# Patient Record
Sex: FEMALE | NOT HISPANIC OR LATINO | URBAN - METROPOLITAN AREA
[De-identification: names, ages, dates, MRNs, and addresses within clinical notes are randomized per-mention and may not be internally consistent; named-entity substitution may affect disease eponyms.]

---

## 2024-03-21 DIAGNOSIS — H10.33 ACUTE BACTERIAL CONJUNCTIVITIS OF BOTH EYES: Primary | ICD-10-CM

## 2024-03-21 RX ORDER — POLYMYXIN B SULFATE AND TRIMETHOPRIM 1; 10000 MG/ML; [USP'U]/ML
1 SOLUTION OPHTHALMIC EVERY 4 HOURS
Qty: 10 ML | Refills: 0 | Status: SHIPPED | OUTPATIENT
Start: 2024-03-21 | End: 2024-03-26

## 2024-03-21 NOTE — PROGRESS NOTES
"History Of Present Illness  Kelsea Otto is a 33 y.o. female presenting for \"No chief complaint on file..\"    HPI     Past Medical History  There are no problems to display for this patient.       Medications  No current outpatient medications     Surgical History  She has a past surgical history that includes Tonsillectomy (01/06/2016).     Social History  She has no history on file for tobacco use, alcohol use, and drug use.    Family History  No family history on file.     Allergies  Patient has no allergy information on record.    ROS  Review of Systems     Last Recorded Vitals  There were no vitals taken for this visit.    Physical Exam    Relevant Results      Assessment/Plan   There are no diagnoses linked to this encounter.        COUNSELING      Medication education:              Education:  The patient is counseled regarding potential side-effects of any and all new medications             Understanding:  Patient expressed understanding             Adherence:  No barriers to adherence identified        Mary Hwang, APRN-CNP           "

## 2025-03-13 ENCOUNTER — OFFICE VISIT (OUTPATIENT)
Dept: URGENT CARE | Age: 35
End: 2025-03-13
Payer: COMMERCIAL

## 2025-03-13 VITALS
OXYGEN SATURATION: 97 % | RESPIRATION RATE: 20 BRPM | SYSTOLIC BLOOD PRESSURE: 136 MMHG | HEIGHT: 64 IN | BODY MASS INDEX: 34.15 KG/M2 | HEART RATE: 95 BPM | WEIGHT: 200 LBS | TEMPERATURE: 97.9 F | DIASTOLIC BLOOD PRESSURE: 88 MMHG

## 2025-03-13 DIAGNOSIS — J01.00 ACUTE MAXILLARY SINUSITIS, RECURRENCE NOT SPECIFIED: Primary | ICD-10-CM

## 2025-03-13 DIAGNOSIS — M62.838 MUSCLE SPASM: ICD-10-CM

## 2025-03-13 RX ORDER — METHOCARBAMOL 500 MG/1
500 TABLET, FILM COATED ORAL 4 TIMES DAILY
Qty: 28 TABLET | Refills: 0 | Status: SHIPPED | OUTPATIENT
Start: 2025-03-13 | End: 2025-03-20

## 2025-03-13 RX ORDER — DOXYCYCLINE HYCLATE 100 MG
100 TABLET ORAL 2 TIMES DAILY
Qty: 20 TABLET | Refills: 0 | Status: SHIPPED | OUTPATIENT
Start: 2025-03-13 | End: 2025-03-23

## 2025-03-13 ASSESSMENT — PATIENT HEALTH QUESTIONNAIRE - PHQ9
2. FEELING DOWN, DEPRESSED OR HOPELESS: NOT AT ALL
1. LITTLE INTEREST OR PLEASURE IN DOING THINGS: NOT AT ALL
SUM OF ALL RESPONSES TO PHQ9 QUESTIONS 1 AND 2: 0

## 2025-03-13 NOTE — PROGRESS NOTES
"Subjective   Patient ID: Kelsea Otto is a 34 y.o. female. They present today with a chief complaint of Nasal Congestion (2 weeks, now having neck pain, ).    History of Present Illness  This is a 34 year old female who presents to the urgent care with c/o uri symptoms/sinus pain and pressure for the past 2 to 3 weeks.  Patient states she is also been under more stress and is having bilateral upper shoulder pain.  Patient denies any chest pain.  Patient states she also has a minor cough patient states no improvement with over-the-counter medication.  Patient denies any fevers or chills.  Patient denies any other systemic complaints at this time. 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           Past Medical History  Allergies as of 03/13/2025 - Reviewed 03/13/2025   Allergen Reaction Noted    Cefaclor Swelling 03/13/2025       (Not in a hospital admission)       Past Medical History:   Diagnosis Date    Encounter for contraceptive management, unspecified 07/19/2016    Contraception management    Encounter for gynecological examination (general) (routine) without abnormal findings 05/26/2017    Well woman exam with routine gynecological exam    Hypothyroidism, unspecified 01/06/2016    Hypothyroidism       Past Surgical History:   Procedure Laterality Date    TONSILLECTOMY  01/06/2016    Tonsillectomy        reports that she has never smoked. She has never used smokeless tobacco.    Review of Systems  Review of Systems   All other systems reviewed and are negative.                                 Objective    Vitals:    03/13/25 1147 03/13/25 1154 03/13/25 1204   BP: (!) 156/99 (!) 153/107 136/88   Pulse: 100 95    Resp: 20     Temp: 36.6 °C (97.9 °F)     SpO2: 97%     Weight: 90.7 kg (200 lb)     Height: 1.626 m (5' 4\")       Patient's last menstrual period was 03/03/2025.    Physical Exam  Vitals and nursing note reviewed.   Constitutional:       Appearance: Normal appearance.   HENT:      Head: " Normocephalic and atraumatic.      Right Ear: Ear canal and external ear normal.      Left Ear: Ear canal and external ear normal.      Ears:      Comments: Middle ear fluid bilaterally     Nose: Nose normal.      Mouth/Throat:      Mouth: Mucous membranes are moist.      Pharynx: Oropharynx is clear.   Eyes:      Extraocular Movements: Extraocular movements intact.      Conjunctiva/sclera: Conjunctivae normal.   Neck:      Comments: Left-sided trapezius muscle tenderness worse with palpation and range of motion.  Cardiovascular:      Rate and Rhythm: Normal rate and regular rhythm.   Pulmonary:      Effort: Pulmonary effort is normal.      Breath sounds: Normal breath sounds.   Musculoskeletal:      Cervical back: Normal range of motion and neck supple. Tenderness present. No rigidity.   Skin:     General: Skin is warm and dry.   Neurological:      General: No focal deficit present.      Mental Status: She is alert and oriented to person, place, and time.   Psychiatric:         Mood and Affect: Mood normal.         Behavior: Behavior normal.         Procedures    Point of Care Test & Imaging Results from this visit  No results found for this visit on 03/13/25.   No results found.    Diagnostic study results (if any) were reviewed by Shaneka Apple PA-C.    Assessment/Plan   Allergies, medications, history, and pertinent labs/EKGs/Imaging reviewed by Shaneka Apple PA-C.     Medical Decision Making  Sinus/muscle spasm-> discussed with patient due to time length of symptoms will prescribe antibiotic to cover for bacterial sinus infection.  Also discussed with patient due to reproducible tenderness to the left trapezius muscle we will prescribe muscle relaxant and take anti-inflammatories.  Patient is afebrile nontoxic-appearing no acute distress.  No tachycardia.  No hypoxia. Care instructions/red flags, return precautions & ADEs discussed when to seek medical attention in clinic vs ER and pt  agreed/understood. Follow up with primary care provider in 3-5 days if no improvement.      Orders and Diagnoses  Diagnoses and all orders for this visit:  Acute maxillary sinusitis, recurrence not specified  -     doxycycline (Vibra-Tabs) 100 mg tablet; Take 1 tablet (100 mg) by mouth 2 times a day for 10 days. Take with a full glass of water and do not lie down for at least 30 minutes after.  Muscle spasm  -     methocarbamol (Robaxin) 500 mg tablet; Take 1 tablet (500 mg) by mouth 4 times a day for 7 days.      Medical Admin Record      Patient disposition: Home    Electronically signed by Shaneka Apple PA-C  12:06 PM